# Patient Record
Sex: MALE | Race: WHITE | HISPANIC OR LATINO | Employment: OTHER | URBAN - METROPOLITAN AREA
[De-identification: names, ages, dates, MRNs, and addresses within clinical notes are randomized per-mention and may not be internally consistent; named-entity substitution may affect disease eponyms.]

---

## 2024-10-21 ENCOUNTER — HOSPITAL ENCOUNTER (EMERGENCY)
Facility: HOSPITAL | Age: 39
Discharge: HOME | End: 2024-10-21
Attending: EMERGENCY MEDICINE | Admitting: EMERGENCY MEDICINE
Payer: OTHER GOVERNMENT

## 2024-10-21 ENCOUNTER — APPOINTMENT (OUTPATIENT)
Dept: ULTRASOUND IMAGING | Facility: HOSPITAL | Age: 39
End: 2024-10-21
Payer: OTHER GOVERNMENT

## 2024-10-21 VITALS
HEART RATE: 69 BPM | WEIGHT: 164.24 LBS | OXYGEN SATURATION: 98 % | RESPIRATION RATE: 18 BRPM | DIASTOLIC BLOOD PRESSURE: 70 MMHG | TEMPERATURE: 97.9 F | SYSTOLIC BLOOD PRESSURE: 105 MMHG

## 2024-10-21 DIAGNOSIS — N50.812 PAIN IN LEFT TESTICLE: ICD-10-CM

## 2024-10-21 DIAGNOSIS — N43.3 HYDROCELE, LEFT: ICD-10-CM

## 2024-10-21 DIAGNOSIS — N45.3 EPIDIDYMO-ORCHITIS, ACUTE: Primary | ICD-10-CM

## 2024-10-21 LAB
BILIRUB UR QL STRIP: NEGATIVE
CLARITY UR: CLEAR
COLOR UR: YELLOW
GLUCOSE UR STRIP-MCNC: NEGATIVE MG/DL
HGB UR QL STRIP.AUTO: NEGATIVE
KETONES UR QL STRIP: NEGATIVE
LEUKOCYTE ESTERASE UR QL STRIP.AUTO: NEGATIVE
NITRITE UR QL STRIP: NEGATIVE
PH UR STRIP.AUTO: 6 [PH] (ref 5–8)
PROT UR QL STRIP: NEGATIVE
SP GR UR STRIP: >1.03 (ref 1–1.03)
UROBILINOGEN UR QL STRIP: ABNORMAL

## 2024-10-21 PROCEDURE — 25010000002 KETOROLAC TROMETHAMINE PER 15 MG

## 2024-10-21 PROCEDURE — 25010000002 CEFTRIAXONE PER 250 MG

## 2024-10-21 PROCEDURE — 25010000002 LIDOCAINE 1 % SOLUTION 10 ML VIAL

## 2024-10-21 PROCEDURE — 76870 US EXAM SCROTUM: CPT

## 2024-10-21 PROCEDURE — 81003 URINALYSIS AUTO W/O SCOPE: CPT

## 2024-10-21 PROCEDURE — 96372 THER/PROPH/DIAG INJ SC/IM: CPT

## 2024-10-21 PROCEDURE — 99284 EMERGENCY DEPT VISIT MOD MDM: CPT

## 2024-10-21 RX ORDER — KETOROLAC TROMETHAMINE 10 MG/1
10 TABLET, FILM COATED ORAL EVERY 6 HOURS
Qty: 20 TABLET | Refills: 0 | Status: SHIPPED | OUTPATIENT
Start: 2024-10-21 | End: 2024-10-26

## 2024-10-21 RX ORDER — LEVOFLOXACIN 500 MG/1
500 TABLET, FILM COATED ORAL ONCE
Status: COMPLETED | OUTPATIENT
Start: 2024-10-21 | End: 2024-10-21

## 2024-10-21 RX ORDER — LEVOFLOXACIN 500 MG/1
500 TABLET, FILM COATED ORAL DAILY
Qty: 9 TABLET | Refills: 0 | Status: SHIPPED | OUTPATIENT
Start: 2024-10-21

## 2024-10-21 RX ORDER — KETOROLAC TROMETHAMINE 30 MG/ML
30 INJECTION, SOLUTION INTRAMUSCULAR; INTRAVENOUS ONCE
Status: COMPLETED | OUTPATIENT
Start: 2024-10-21 | End: 2024-10-21

## 2024-10-21 RX ADMIN — KETOROLAC TROMETHAMINE 30 MG: 30 INJECTION, SOLUTION INTRAMUSCULAR; INTRAVENOUS at 22:11

## 2024-10-21 RX ADMIN — LEVOFLOXACIN 500 MG: 500 TABLET, FILM COATED ORAL at 22:30

## 2024-10-21 RX ADMIN — LIDOCAINE HYDROCHLORIDE 500 MG: 10 INJECTION, SOLUTION INFILTRATION; PERINEURAL at 22:30

## 2024-10-21 NOTE — Clinical Note
[facility] EMERGENCY ROOM  [address] [address]  Phone: [phone]  Fax: [phone]    Bryn Vaca was seen and treated in our emergency department on 10/21/2024.  He may return to work on 10/25/2024.         Thank you for choosing [facility].    Seaver, Alyce B, APRN

## 2024-10-21 NOTE — ED PROVIDER NOTES
Time: 6:35 PM EDT  Date of encounter:  10/21/2024  Independent Historian/Clinical History and Information was obtained by:   Patient    History is limited by: N/A    Chief Complaint: Testicle pain      History of Present Illness:  Patient is a 38 y.o. year old male who presents to the emergency department for evaluation of left testicular pain and swelling x 2 days.  Had a vasectomy 6 weeks ago.  Denies recent heavy lifting.  Denies urinary complaints.      Patient Care Team  Primary Care Provider: Provider, No Known    Past Medical History:     Allergies   Allergen Reactions    Primaquine Unknown - High Severity     History reviewed. No pertinent past medical history.  History reviewed. No pertinent surgical history.  History reviewed. No pertinent family history.    Home Medications:  Prior to Admission medications    Not on File        Social History:   Social History     Tobacco Use    Smoking status: Never    Smokeless tobacco: Never   Substance Use Topics    Alcohol use: Never    Drug use: Never         Review of Systems:  Review of Systems   Constitutional:  Negative for fever.   Genitourinary:  Positive for scrotal swelling and testicular pain. Negative for dysuria and hematuria.        Physical Exam:  /70   Pulse 69   Temp 97.9 °F (36.6 °C) (Oral)   Resp 18   Wt 74.5 kg (164 lb 3.9 oz)   SpO2 98%     Physical Exam  Vitals reviewed. Exam conducted with a chaperone present.   Constitutional:       Appearance: Normal appearance.   HENT:      Head: Normocephalic.   Eyes:      Extraocular Movements: Extraocular movements intact.      Conjunctiva/sclera: Conjunctivae normal.   Pulmonary:      Effort: Pulmonary effort is normal.   Abdominal:      General: There is no distension.      Hernia: There is no hernia in the left inguinal area or right inguinal area.   Genitourinary:     Penis: Normal.       Testes:         Left: Tenderness and testicular hydrocele present.   Lymphadenopathy:      Lower Body:  No right inguinal adenopathy. No left inguinal adenopathy.   Skin:     General: Skin is warm.      Coloration: Skin is not cyanotic.   Neurological:      Mental Status: He is alert and oriented to person, place, and time.   Psychiatric:         Attention and Perception: Attention and perception normal.         Mood and Affect: Mood normal.                Procedures:  Procedures      Medical Decision Making:      Comorbidities that affect care:    None    External Notes reviewed:    None      The following orders were placed and all results were independently analyzed by me:  Orders Placed This Encounter   Procedures    US Scrotum & Testicles    Urinalysis With Microscopic If Indicated (No Culture) - Urine, Clean Catch       Medications Given in the Emergency Department:  Medications   ketorolac (TORADOL) injection 30 mg (30 mg Intramuscular Given 10/21/24 2211)   cefTRIAXone (ROCEPHIN) 250 mg/ml in lidocaine 1% IM syringe (500 mg vial) (500 mg Intramuscular Given 10/21/24 2230)   levoFLOXacin (LEVAQUIN) tablet 500 mg (500 mg Oral Given 10/21/24 2230)        ED Course:    ED Course as of 10/22/24 0153   Mon Oct 21, 2024   1836 --- PROVIDER IN TRIAGE NOTE ---    The patient was evaluated by Kaitlin horowitz in triage. Orders were placed and the patient is currently awaiting disposition.    [AJ]      ED Course User Index  [AJ] Kaitlin Torres PA-C       Labs:    Lab Results (last 24 hours)       Procedure Component Value Units Date/Time    Urinalysis With Microscopic If Indicated (No Culture) - Urine, Clean Catch [[id number]]  (Abnormal) Collected: 10/21/24 1903    Specimen: Urine, Clean Catch Updated: 10/21/24 1923     Color, UA Yellow     Appearance, UA Clear     pH, UA 6.0     Specific Gravity, UA >1.030     Glucose, UA Negative     Ketones, UA Negative     Bilirubin, UA Negative     Blood, UA Negative     Protein, UA Negative     Leuk Esterase, UA Negative     Nitrite, UA Negative     Urobilinogen, UA 1.0  E.U./dL    Narrative:      Urine microscopic not indicated.             Imaging:    US Scrotum & Testicles    Result Date: 10/21/2024  US SCROTUM AND TESTICLES Date of Exam: 10/21/2024 6:55 PM EDT Indication: Left testicular pain and swelling. Comparison: No comparisons available. Technique: Multiple sonographic images of the scrotum were obtained in transverse and longitudinal planes. Grayscale and color Doppler duplex techniques were utilized.  Doppler spectral analysis was performed. Findings: Right scrotum:  Testis measurements: 4.3 x 3.1 x 2.9 cm. Testis appearance: Mild microlithiasis. Otherwise, normal echotexture and vascularity. No intratesticular masses. Doppler: Normal waveforms. Epididymis: Normal. Other: No hydrocele or varicocele. Left scrotum: Testis measurements: 3.9 x 2.8 x 2.2 cm. Testis appearance: Mild microlithiasis. Otherwise, normal appearing echotexture. There is asymmetrically increased vascularity. Doppler: Normal waveforms. Epididymis: Asymmetrically increased vascularity. Other: Left-sided hydrocele. No convincing varicocele.     Impression: Asymmetrically increased vascularity in the left epididymis and testicle suspicious for left epididymoorchitis. Associated left hydrocele. Electronically Signed: Brian Bowers  10/21/2024 7:56 PM EDT  Workstation ID: [id number]       Differential Diagnosis and Discussion:    Testicular Pain: Differential diagnosis includes but is not limited to epididymitis, orchitis, testicular torsion, testicular tumor, testicular trauma, hydrocele, varicocele, spermatocele, prostatitis, scrotal cellulitis, and urolithiasis.    All labs were reviewed and interpreted by me.  Ultrasound impression was interpreted by me.     MDM  Number of Diagnoses or Management Options  Epididymo-orchitis, acute  Hydrocele, left  Pain in left testicle  Diagnosis management comments: Differential diagnosis for testicular pain includes but is not limited to testicular torsion,  testicular cancer, hydrocele, varicocele, epididymal cyst.  Negative white blood cell count negative CMP.  Ultrasound positive for hydrocele and orchitis.  Patient following up with urology on Thursday.  Patient started on antibiotics in the emergency department and pain was controlled.  Patient will follow-up as instructed.       Amount and/or Complexity of Data Reviewed  Clinical lab tests: reviewed  Tests in the radiology section of CPT®: reviewed           Patient Care Considerations:    SEPSIS was considered but is NOT present in the emergency department as SIRS criteria is not present.      Consultants/Shared Management Plan:    None    Social Determinants of Health:    Patient is independent, reliable, and has access to care.       Disposition and Care Coordination:    Discharged: The patient is suitable and stable for discharge with no need for consideration of admission.    I have explained the patient´s condition, diagnoses and treatment plan based on the information available to me at this time. I have answered questions and addressed any concerns. The patient has a good  understanding of the patient´s diagnosis, condition, and treatment plan as can be expected at this point. The vital signs have been stable. The patient´s condition is stable and appropriate for discharge from the emergency department.      The patient will pursue further outpatient evaluation with the primary care physician or other designated or consulting physician as outlined in the discharge instructions. They are agreeable to this plan of care and follow-up instructions have been explained in detail. The patient has received these instructions in written format and has expressed an understanding of the discharge instructions. The patient is aware that any significant change in condition or worsening of symptoms should prompt an immediate return to this or the closest emergency department or call to 911.  I have explained discharge  medications and the need for follow up with the patient/caretakers. This was also printed in the discharge instructions. Patient was discharged with the following medications and follow up:      Medication List        New Prescriptions      ketorolac 10 MG tablet  Commonly known as: TORADOL  Take 1 tablet by mouth Every 6 (Six) Hours for 5 days.     levoFLOXacin 500 MG tablet  Commonly known as: LEVAQUIN  Take 1 tablet by mouth Daily.               Where to Get Your Medications        These medications were sent to [id number] - [place], [place] [id number] AT [facility] OF [address] & [place] - [phone]  - [phone]   [address], [facility] [address]      Phone: [phone]   ketorolac 10 MG tablet  levoFLOXacin 500 MG tablet      Provider, No Known  [facility]  [place] [address]             Final diagnoses:   Epididymo-orchitis, acute   Pain in left testicle   Hydrocele, left        ED Disposition       ED Disposition   Discharge    Condition   Stable    Comment   --               This medical record created using voice recognition software.             Seaver, Alyce B, APRN  10/22/24 0153

## 2024-10-22 NOTE — DISCHARGE INSTRUCTIONS
Follow-up with your primary care provider.  Keep follow-up appointment with urology.  Return to ER symptoms worsen or fail to improve.    You are being sent home with a pain medication, Toradol.  Take every 6 hours for the next 5 days.  You may also take Tylenol but do not exceed 4 g in 24 hours.  Do not take ibuprofen while also taking Toradol.    You are being sent home with an antibiotic, Levaquin.  Take daily for the next 9 days.  You received the first dose in the emergency department.